# Patient Record
Sex: MALE | Race: WHITE
[De-identification: names, ages, dates, MRNs, and addresses within clinical notes are randomized per-mention and may not be internally consistent; named-entity substitution may affect disease eponyms.]

---

## 2017-01-14 ENCOUNTER — PRIOR ORIGINAL RECORDS (OUTPATIENT)
Dept: OTHER | Age: 32
End: 2017-01-14

## 2017-01-14 ENCOUNTER — APPOINTMENT (OUTPATIENT)
Dept: CV DIAGNOSTICS | Facility: HOSPITAL | Age: 32
End: 2017-01-14
Attending: HOSPITALIST
Payer: COMMERCIAL

## 2017-01-14 ENCOUNTER — HOSPITAL ENCOUNTER (OUTPATIENT)
Facility: HOSPITAL | Age: 32
Setting detail: OBSERVATION
Discharge: HOME | End: 2017-01-14
Attending: EMERGENCY MEDICINE | Admitting: HOSPITALIST
Payer: COMMERCIAL

## 2017-01-14 ENCOUNTER — APPOINTMENT (OUTPATIENT)
Dept: GENERAL RADIOLOGY | Facility: HOSPITAL | Age: 32
End: 2017-01-14
Attending: EMERGENCY MEDICINE
Payer: COMMERCIAL

## 2017-01-14 VITALS
RESPIRATION RATE: 18 BRPM | HEIGHT: 72 IN | DIASTOLIC BLOOD PRESSURE: 64 MMHG | TEMPERATURE: 98 F | HEART RATE: 80 BPM | SYSTOLIC BLOOD PRESSURE: 124 MMHG | BODY MASS INDEX: 25.98 KG/M2 | OXYGEN SATURATION: 97 % | WEIGHT: 191.81 LBS

## 2017-01-14 DIAGNOSIS — I48.91 ATRIAL FIBRILLATION WITH RVR (HCC): Primary | ICD-10-CM

## 2017-01-14 LAB
APTT PPP: 32.7 SECONDS (ref 25–34)
ATRIAL RATE: 131 BPM
ATRIAL RATE: 76 BPM
BASOPHILS # BLD AUTO: 0.08 X10(3) UL (ref 0–0.1)
BASOPHILS NFR BLD AUTO: 0.8 %
BUN BLD-MCNC: 18 MG/DL (ref 8–20)
CALCIUM BLD-MCNC: 8.8 MG/DL (ref 8.3–10.3)
CHLORIDE: 103 MMOL/L (ref 101–111)
CO2: 31 MMOL/L (ref 22–32)
CREAT BLD-MCNC: 1.08 MG/DL (ref 0.7–1.3)
D-DIMER: <0.27 UG/ML FEU (ref 0–0.49)
EOSINOPHIL # BLD AUTO: 0.24 X10(3) UL (ref 0–0.3)
EOSINOPHIL NFR BLD AUTO: 2.5 %
ERYTHROCYTE [DISTWIDTH] IN BLOOD BY AUTOMATED COUNT: 11.9 % (ref 11.5–16)
GLUCOSE BLD-MCNC: 100 MG/DL (ref 70–99)
HAV IGM SER QL: 2.4 MG/DL (ref 1.7–3)
HCT VFR BLD AUTO: 45.2 % (ref 37–53)
HGB BLD-MCNC: 15.5 G/DL (ref 13–17)
IMMATURE GRANULOCYTE COUNT: 0.02 X10(3) UL (ref 0–1)
IMMATURE GRANULOCYTE RATIO %: 0.2 %
INR BLD: 0.88 (ref 0.89–1.12)
LYMPHOCYTES # BLD AUTO: 4.82 X10(3) UL (ref 0.9–4)
LYMPHOCYTES NFR BLD AUTO: 51.2 %
MCH RBC QN AUTO: 30.2 PG (ref 27–33.2)
MCHC RBC AUTO-ENTMCNC: 34.3 G/DL (ref 31–37)
MCV RBC AUTO: 88.1 FL (ref 80–99)
MONOCYTES # BLD AUTO: 0.87 X10(3) UL (ref 0.1–0.6)
MONOCYTES NFR BLD AUTO: 9.2 %
NEUTROPHIL ABS PRELIM: 3.39 X10 (3) UL (ref 1.3–6.7)
NEUTROPHILS # BLD AUTO: 3.39 X10(3) UL (ref 1.3–6.7)
NEUTROPHILS NFR BLD AUTO: 36.1 %
P AXIS: 44 DEGREES
P-R INTERVAL: 172 MS
PLATELET # BLD AUTO: 266 10(3)UL (ref 150–450)
POTASSIUM SERPL-SCNC: 3.9 MMOL/L (ref 3.6–5.1)
PSA SERPL DL<=0.01 NG/ML-MCNC: 12.2 SECONDS (ref 12.3–14.8)
Q-T INTERVAL: 318 MS
Q-T INTERVAL: 408 MS
QRS DURATION: 90 MS
QRS DURATION: 94 MS
QTC CALCULATION (BEZET): 459 MS
QTC CALCULATION (BEZET): 465 MS
R AXIS: 61 DEGREES
R AXIS: 64 DEGREES
RBC # BLD AUTO: 5.13 X10(6)UL (ref 4.3–5.7)
RED CELL DISTRIBUTION WIDTH-SD: 38.4 FL (ref 35.1–46.3)
SODIUM SERPL-SCNC: 140 MMOL/L (ref 136–144)
T AXIS: 14 DEGREES
T AXIS: 31 DEGREES
TROPONIN: <0.046 NG/ML (ref ?–0.05)
TSI SER-ACNC: 4.35 MIU/ML (ref 0.35–5.5)
VENTRICULAR RATE: 129 BPM
VENTRICULAR RATE: 76 BPM
WBC # BLD AUTO: 9.4 X10(3) UL (ref 4–13)

## 2017-01-14 PROCEDURE — 99234 HOSP IP/OBS SM DT SF/LOW 45: CPT | Performed by: HOSPITALIST

## 2017-01-14 PROCEDURE — 93306 TTE W/DOPPLER COMPLETE: CPT | Performed by: INTERNAL MEDICINE

## 2017-01-14 PROCEDURE — 93306 TTE W/DOPPLER COMPLETE: CPT

## 2017-01-14 PROCEDURE — 71010 XR CHEST AP PORTABLE  (CPT=71010): CPT

## 2017-01-14 RX ORDER — ENOXAPARIN SODIUM 100 MG/ML
40 INJECTION SUBCUTANEOUS DAILY
Status: DISCONTINUED | OUTPATIENT
Start: 2017-01-14 | End: 2017-01-14

## 2017-01-14 RX ORDER — ASPIRIN 81 MG/1
81 TABLET ORAL DAILY
Status: DISCONTINUED | OUTPATIENT
Start: 2017-01-15 | End: 2017-01-14

## 2017-01-14 RX ORDER — DILTIAZEM HYDROCHLORIDE 180 MG/1
180 CAPSULE, EXTENDED RELEASE ORAL DAILY
Status: DISCONTINUED | OUTPATIENT
Start: 2017-01-14 | End: 2017-01-14

## 2017-01-14 RX ORDER — ACETAMINOPHEN 325 MG/1
650 TABLET ORAL EVERY 6 HOURS PRN
Status: DISCONTINUED | OUTPATIENT
Start: 2017-01-14 | End: 2017-01-14

## 2017-01-14 RX ORDER — DILTIAZEM HYDROCHLORIDE 180 MG/1
180 CAPSULE, COATED, EXTENDED RELEASE ORAL DAILY
Qty: 30 CAPSULE | Refills: 3 | Status: SHIPPED | OUTPATIENT
Start: 2017-01-14

## 2017-01-14 RX ORDER — ASPIRIN 325 MG
325 TABLET, DELAYED RELEASE (ENTERIC COATED) ORAL DAILY
Status: DISCONTINUED | OUTPATIENT
Start: 2017-01-14 | End: 2017-01-14

## 2017-01-14 RX ORDER — DILTIAZEM HYDROCHLORIDE 5 MG/ML
10 INJECTION INTRAVENOUS
Status: DISPENSED | OUTPATIENT
Start: 2017-01-14 | End: 2017-01-14

## 2017-01-14 RX ORDER — ASPIRIN 81 MG/1
81 TABLET ORAL DAILY
Qty: 30 TABLET | Refills: 0 | Status: SHIPPED | COMMUNITY
Start: 2017-01-15

## 2017-01-14 RX ORDER — ONDANSETRON 2 MG/ML
4 INJECTION INTRAMUSCULAR; INTRAVENOUS EVERY 6 HOURS PRN
Status: DISCONTINUED | OUTPATIENT
Start: 2017-01-14 | End: 2017-01-14

## 2017-01-14 NOTE — ED PROVIDER NOTES
Patient Seen in: [facility] Emergency Department    History   Patient presents with:  Arrythmia/Palpitations (cardiovascular)    Stated Complaint: palpitations    HPI    Demetrio Jones is a 72-year-old male coming with complaints of palpitations.   He state rhythm  Abdomen soft nontender with no rebound tenderness noted  Extremity exam shows no clubbing cyanosis or edema  Skin exam shows no rashes or lacerations  Neuro exam shows no focal deficits  Back exam shows no tenderness       ED Course     Labs Review specified. Medications Prescribed:  There are no discharge medications for this patient.       Present on Admission           ICD-10-CM Noted POA    Atrial fibrillation with RVR ([facility] [address] I48.91 1/14/2017 Unknown

## 2017-01-14 NOTE — PAYOR COMM NOTE
Attending Physician: No att. providers found    Review Type: ADMISSION   Reviewer: Ronny Ba       Date: January 14, 2017 - 2:18 PM  Payor: ZOHREH PPDENICE  Authorization Number: [id number]  Admit date: 1/14/2017  1:31 AM   Admitted from Emergency Dept.: Yes    Millicent Currie 1/14/2017 1/14/2017 0251 Given 15 mg Oral Salvador Caraballo RN      sodium chloride 0.9% IV bolus 1,000 mL     Date Action Dose Route User    Discharged on 1/14/2017 1/14/2017 0145 New Bag 1000 mL Intravenous Salvador Caraballo RN        RESULTS LAST 24HRS:

## 2017-01-14 NOTE — ED INITIAL ASSESSMENT (HPI)
Palpitations onset 0045, taking his breath away at times. Pt reports that this has happened before, off/on for the past year, but he never got it checked out because the sxs only lasted a few seconds up until now.

## 2017-01-14 NOTE — PLAN OF CARE
NURSING DISCHARGE NOTE    Discharged Home via Wheelchair. Accompanied by Support staff  Belongings Taken by patient/family. Follow up and education provided. Tele d/c'd and IV removed without complications.

## 2017-01-14 NOTE — CONSULTS
[facility]  Report of Consultation    Charlotte Alina Patient Status:  Observation    5/3/1985 MRN [id number]   [facility] [place] [facility] 2NE-A Attending Conner Ludwig MD   Hosp Day # 0 PCP None Pcp     Reason for Consu oz (87 kg), SpO2 99 %. Temp (24hrs), Av.1 °F (36.7 °C), Min:98.1 °F (36.7 °C), Max:98.1 °F (36.7 °C)    Wt Readings from Last 3 Encounters:  17 : 191 lb 12.8 oz (87 kg)      Telemetry: AF converted to SR.    General: Alert and oriented in no appa

## 2017-01-14 NOTE — PROGRESS NOTES
[facility]  History and Physical     Adrian Phillips Patient Status:  Emergency    5/3/1985 MRN [id number]   Location [facility] Attending Aure Henry MD   Hosp Day # 0 PCP None Pcp     Chief Complaint: Dyspnea Moves all extremities. Extremities: No edema or cyanosis. Integument: No rashes or lesions. Psychiatric: Appropriate mood and affect.       Diagnostic Data:      Labs:  Recent Labs   Lab  01/14/17   0138   WBC  9.4   HGB  15.5   MCV  88.1   PLT  266.0

## 2017-01-14 NOTE — PLAN OF CARE
CARDIOVASCULAR - ADULT    • Maintains optimal cardiac output and hemodynamic stability Progressing    • Absence of cardiac arrhythmias or at baseline Progressing          Pt admitted with Afib RVR. Cardizem gtt infusing @ 5 ml/hr. AOx4.  O2 sats maintained

## 2017-01-14 NOTE — DISCHARGE SUMMARY
[facility] [place] [facility]  DISCHARGE SUMMARY     Gabriel Colon Patient Status:  Observation    5/3/1985 MRN [id number]   [facility] ([place] [place] 2NE-A Attending Windy Sanon MD   [facility] Day # 0 PCP None Pcp     Date of Admission: 2017  Date of Discharge: anti-arrhythmic if AF recurs despite cardizem.    · Discussed consideration for EPS and ablation.     Lab/Test results pending at Discharge:   · none    Consultants:  • Cardiology    Antibiotic: none   End date: none    Discharge Medication List:     Arletha Blue edema.  -----------------------------------------------------------------------------------------------  PATIENT DISCHARGE INSTRUCTIONS: See electronic chart    Jose Eldridge MD 1/14/2017    Time spent:  > 30 minutes

## 2017-01-31 ENCOUNTER — PRIOR ORIGINAL RECORDS (OUTPATIENT)
Dept: OTHER | Age: 32
End: 2017-01-31

## 2017-02-02 ENCOUNTER — PRIOR ORIGINAL RECORDS (OUTPATIENT)
Dept: OTHER | Age: 32
End: 2017-02-02

## 2017-02-06 LAB
BUN: 18 MG/DL
CALCIUM: 8.8 MG/DL
CHLORIDE: 103 MEQ/L
CREATININE, SERUM: 1.08 MG/DL
GLUCOSE: 100 MG/DL
HEMATOCRIT: 45.2 %
HEMOGLOBIN: 15.5 G/DL
MAGNESIUM: 2.4 MG/DL
PLATELETS: 266 K/UL
POTASSIUM, SERUM: 3.9 MEQ/L
RED BLOOD COUNT: 5.13 X 10-6/U
SODIUM: 140 MEQ/L
THYROID STIMULATING HORMONE: 4.35 MLU/L
WHITE BLOOD COUNT: 9.4 X 10-3/U

## 2017-02-07 ENCOUNTER — PRIOR ORIGINAL RECORDS (OUTPATIENT)
Dept: OTHER | Age: 32
End: 2017-02-07

## 2017-02-09 ENCOUNTER — HOSPITAL ENCOUNTER (OUTPATIENT)
Dept: CT IMAGING | Facility: HOSPITAL | Age: 32
Discharge: HOME | End: 2017-02-09
Attending: INTERNAL MEDICINE
Payer: COMMERCIAL

## 2017-02-09 DIAGNOSIS — R07.9 CHEST PAIN ON EXERTION: ICD-10-CM

## 2017-02-09 DIAGNOSIS — R06.00 DYSPNEA: ICD-10-CM

## 2017-02-09 PROCEDURE — 71275 CT ANGIOGRAPHY CHEST: CPT

## 2017-02-09 RX ORDER — NITROGLYCERIN 0.4 MG/1
TABLET SUBLINGUAL
Status: COMPLETED
Start: 2017-02-09 | End: 2017-02-09

## 2017-02-09 RX ADMIN — NITROGLYCERIN 0.4 MG: 0.4 TABLET SUBLINGUAL at 09:45:00

## 2017-02-20 ENCOUNTER — PRIOR ORIGINAL RECORDS (OUTPATIENT)
Dept: OTHER | Age: 32
End: 2017-02-20

## 2017-03-08 ENCOUNTER — PRIOR ORIGINAL RECORDS (OUTPATIENT)
Dept: OTHER | Age: 32
End: 2017-03-08

## 2017-08-09 ENCOUNTER — PRIOR ORIGINAL RECORDS (OUTPATIENT)
Dept: OTHER | Age: 32
End: 2017-08-09

## 2017-08-15 ENCOUNTER — PRIOR ORIGINAL RECORDS (OUTPATIENT)
Dept: OTHER | Age: 32
End: 2017-08-15

## 2017-08-15 ENCOUNTER — MYAURORA ACCOUNT LINK (OUTPATIENT)
Dept: OTHER | Age: 32
End: 2017-08-15

## 2018-02-21 ENCOUNTER — MYAURORA ACCOUNT LINK (OUTPATIENT)
Dept: OTHER | Age: 33
End: 2018-02-21

## 2018-02-21 ENCOUNTER — PRIOR ORIGINAL RECORDS (OUTPATIENT)
Dept: OTHER | Age: 33
End: 2018-02-21

## 2018-03-07 ENCOUNTER — HOSPITAL ENCOUNTER (OUTPATIENT)
Dept: CV DIAGNOSTICS | Facility: HOSPITAL | Age: 33
Discharge: HOME | End: 2018-03-07
Attending: INTERNAL MEDICINE
Payer: COMMERCIAL

## 2018-03-07 DIAGNOSIS — R06.00 DYSPNEA: ICD-10-CM

## 2018-03-07 DIAGNOSIS — I48.91 A-FIB (HCC): ICD-10-CM

## 2018-03-07 PROCEDURE — 93017 CV STRESS TEST TRACING ONLY: CPT | Performed by: INTERNAL MEDICINE

## 2018-03-07 PROCEDURE — 93018 CV STRESS TEST I&R ONLY: CPT | Performed by: INTERNAL MEDICINE

## 2018-04-13 ENCOUNTER — MYAURORA ACCOUNT LINK (OUTPATIENT)
Dept: OTHER | Age: 33
End: 2018-04-13

## 2018-04-13 ENCOUNTER — PRIOR ORIGINAL RECORDS (OUTPATIENT)
Dept: OTHER | Age: 33
End: 2018-04-13

## 2018-05-03 ENCOUNTER — PRIOR ORIGINAL RECORDS (OUTPATIENT)
Dept: OTHER | Age: 33
End: 2018-05-03

## 2019-02-28 VITALS
HEART RATE: 75 BPM | DIASTOLIC BLOOD PRESSURE: 66 MMHG | BODY MASS INDEX: 25.6 KG/M2 | HEIGHT: 72 IN | WEIGHT: 189 LBS | SYSTOLIC BLOOD PRESSURE: 112 MMHG

## 2019-02-28 VITALS
WEIGHT: 192 LBS | SYSTOLIC BLOOD PRESSURE: 116 MMHG | HEART RATE: 70 BPM | BODY MASS INDEX: 26.01 KG/M2 | HEIGHT: 72 IN | DIASTOLIC BLOOD PRESSURE: 66 MMHG

## 2019-02-28 VITALS
WEIGHT: 201 LBS | SYSTOLIC BLOOD PRESSURE: 118 MMHG | DIASTOLIC BLOOD PRESSURE: 74 MMHG | HEIGHT: 72 IN | HEART RATE: 76 BPM | BODY MASS INDEX: 27.22 KG/M2

## 2019-02-28 VITALS
WEIGHT: 196 LBS | HEIGHT: 72 IN | BODY MASS INDEX: 26.55 KG/M2 | DIASTOLIC BLOOD PRESSURE: 72 MMHG | HEART RATE: 74 BPM | SYSTOLIC BLOOD PRESSURE: 124 MMHG

## 2019-03-01 VITALS
WEIGHT: 195 LBS | HEART RATE: 84 BPM | DIASTOLIC BLOOD PRESSURE: 68 MMHG | HEIGHT: 72 IN | SYSTOLIC BLOOD PRESSURE: 130 MMHG | BODY MASS INDEX: 26.41 KG/M2

## 2019-03-01 VITALS
WEIGHT: 195 LBS | SYSTOLIC BLOOD PRESSURE: 112 MMHG | HEART RATE: 85 BPM | DIASTOLIC BLOOD PRESSURE: 60 MMHG | HEIGHT: 72 IN | BODY MASS INDEX: 26.41 KG/M2

## 2022-06-10 ENCOUNTER — HOSPITAL ENCOUNTER (OUTPATIENT)
Dept: CT IMAGING | Facility: HOSPITAL | Age: 37
Discharge: HOME | End: 2022-06-10
Attending: FAMILY MEDICINE
Payer: COMMERCIAL

## 2022-06-10 DIAGNOSIS — R91.8 PULMONARY NODULES/LESIONS, MULTIPLE: ICD-10-CM

## 2022-06-10 PROCEDURE — 71250 CT THORAX DX C-: CPT | Performed by: FAMILY MEDICINE

## (undated) NOTE — IP AVS SNAPSHOT
[facility] [place] [address] [place], [address] ~ [phone]                Discharge Summary   1/14/2017    Nora Arce           Admission Information        Provider Department    1/14/2017 Jose Eldridge MD  2ne-A         Janine Arredondo DILTIAZEM EXTENDED-RELEASE CAPSULES OR TABLETS (ENGLISH)      Follow-up Information     Follow up with Douglas Martel MD.    Specialties:  Cardiovascular Diseases, CARDIOLOGY    Why:  please call to see Dr. Georgiana Huang or ASHLEIGH in 2-3 weeks    Contact informat - If you are a smoker or have smoked in the last 12 months, we encourage you to explore options for quitting.     - If you have concerns related to behavioral health issues or thoughts of harming yourself, contact [facility] a provide you with additional printed information. Not all patients will experience these side effects or respond to medications the same. Please call your provider or healthcare team if you have any questions regarding your medications while at home.